# Patient Record
Sex: MALE | Race: WHITE | URBAN - METROPOLITAN AREA
[De-identification: names, ages, dates, MRNs, and addresses within clinical notes are randomized per-mention and may not be internally consistent; named-entity substitution may affect disease eponyms.]

---

## 2018-06-27 ENCOUNTER — OFFICE (OUTPATIENT)
Dept: URBAN - METROPOLITAN AREA CLINIC 101 | Facility: CLINIC | Age: 73
End: 2018-06-27
Payer: MEDICARE

## 2018-06-27 VITALS
HEART RATE: 81 BPM | HEIGHT: 68 IN | DIASTOLIC BLOOD PRESSURE: 70 MMHG | WEIGHT: 167 LBS | TEMPERATURE: 97.9 F | SYSTOLIC BLOOD PRESSURE: 135 MMHG

## 2018-06-27 DIAGNOSIS — R49.0 DYSPHONIA: ICD-10-CM

## 2018-06-27 DIAGNOSIS — K21.9 GASTRO-ESOPHAGEAL REFLUX DISEASE WITHOUT ESOPHAGITIS: ICD-10-CM

## 2018-06-27 DIAGNOSIS — Z79.01 LONG TERM (CURRENT) USE OF ANTICOAGULANTS: ICD-10-CM

## 2018-06-27 DIAGNOSIS — R05 COUGH: ICD-10-CM

## 2018-06-27 PROCEDURE — 99214 OFFICE O/P EST MOD 30 MIN: CPT

## 2018-06-27 NOTE — SERVICEHPINOTES
MAVIS GALEANA   is a   72   male here to discuss GERD. For the past two years, he notes frequent throat clearing, hoarse voice, and chronic cough. He saw Dr. Batista (ENT) who tested him for allergies. He has tried nasal sprays, antihistamines, etc which haven't helped. He underwent a barium swallow which showed GERD. He denies heartburn or GERD. He also denies abdominal pain, anorexia, nausea, vomiting, unexplained weight loss. He has never tried a PPI before. He doesn't smoke or drink ETOH. He had a CVA in 2007 and has been on Plavix for years. He also takes a baby ASA but no additional NSAID use. No known heart issues. No other GI complaints and he is up to date on his colonoscopy.  BR

## 2019-04-17 ENCOUNTER — ON CAMPUS - OUTPATIENT (OUTPATIENT)
Dept: URBAN - METROPOLITAN AREA HOSPITAL 35 | Facility: HOSPITAL | Age: 74
End: 2019-04-17
Payer: OTHER GOVERNMENT

## 2019-04-17 DIAGNOSIS — K20.8 OTHER ESOPHAGITIS: ICD-10-CM

## 2019-04-17 DIAGNOSIS — R05 COUGH: ICD-10-CM

## 2019-04-17 DIAGNOSIS — K29.60 OTHER GASTRITIS WITHOUT BLEEDING: ICD-10-CM

## 2019-04-17 DIAGNOSIS — K21.9 GASTRO-ESOPHAGEAL REFLUX DISEASE WITHOUT ESOPHAGITIS: ICD-10-CM

## 2019-04-17 PROCEDURE — 43239 EGD BIOPSY SINGLE/MULTIPLE: CPT

## 2019-05-07 ENCOUNTER — OFFICE (OUTPATIENT)
Dept: URBAN - METROPOLITAN AREA CLINIC 101 | Facility: CLINIC | Age: 74
End: 2019-05-07
Payer: OTHER GOVERNMENT

## 2019-05-07 VITALS
SYSTOLIC BLOOD PRESSURE: 131 MMHG | DIASTOLIC BLOOD PRESSURE: 70 MMHG | WEIGHT: 179 LBS | TEMPERATURE: 98.4 F | HEART RATE: 79 BPM | HEIGHT: 68 IN

## 2019-05-07 DIAGNOSIS — R05 COUGH: ICD-10-CM

## 2019-05-07 DIAGNOSIS — K21.9 GASTRO-ESOPHAGEAL REFLUX DISEASE WITHOUT ESOPHAGITIS: ICD-10-CM

## 2019-05-07 DIAGNOSIS — B96.81 HELICOBACTER PYLORI [H. PYLORI] AS THE CAUSE OF DISEASES CLA: ICD-10-CM

## 2019-05-07 PROCEDURE — 99213 OFFICE O/P EST LOW 20 MIN: CPT

## 2019-05-07 NOTE — SERVICEHPINOTES
Mr. Mazariegos is here for f/u to recent EGD for chronic cough, GERD, and a hoarse voice. He was initially evaluated for this last year. He ended up undergoing a cardiac ablation so the EGD got postponed until recently. The EGD showed   esophagitis, gastritis, and h pylori. He was treated with a prevpac and is now here for f/u. He has been on a PPI x 1 year. Now, his hoarse voice has cleared up and no longer having to clear his throat.  He is asymptomatic on the PPI with no GI complaints today.

## 2019-07-02 ENCOUNTER — OFFICE (OUTPATIENT)
Dept: URBAN - METROPOLITAN AREA CLINIC 101 | Facility: CLINIC | Age: 74
End: 2019-07-02
Payer: OTHER GOVERNMENT

## 2019-07-02 VITALS
HEART RATE: 75 BPM | TEMPERATURE: 98.4 F | DIASTOLIC BLOOD PRESSURE: 66 MMHG | SYSTOLIC BLOOD PRESSURE: 111 MMHG | WEIGHT: 170 LBS | HEIGHT: 68 IN

## 2019-07-02 DIAGNOSIS — R05 COUGH: ICD-10-CM

## 2019-07-02 DIAGNOSIS — K21.9 GASTRO-ESOPHAGEAL REFLUX DISEASE WITHOUT ESOPHAGITIS: ICD-10-CM

## 2019-07-02 DIAGNOSIS — B96.81 HELICOBACTER PYLORI [H. PYLORI] AS THE CAUSE OF DISEASES CLA: ICD-10-CM

## 2019-07-02 PROCEDURE — 99213 OFFICE O/P EST LOW 20 MIN: CPT

## 2019-07-02 NOTE — SERVICEHPINOTES
Mr. Mazariegos is here for f/u to recent EGD for chronic cough, GERD, and a hoarse voice. He was initially evaluated for this last year. He ended up undergoing a cardiac ablation so the EGD got postponed until recently. The EGD showed esophagitis, gastritis, and h pylori. He was treated with a prevpac and is now here for f/u. He has been on a PPI x 1 year. Now, his hoarse voice has cleared up and no longer having to clear his throat. Unfortunately, he tested still + for h pylori upon breath test. He just finished taking Pylera on 6/23/19. He had diarrhea while on the Pylera but he is now feeling better with normal bowel movements. Currently, not experiencing a cough, hoarse voice, and he isn't feeling any heartburn or acid reflux.

## 2019-08-13 ENCOUNTER — OFFICE (OUTPATIENT)
Dept: URBAN - METROPOLITAN AREA CLINIC 101 | Facility: CLINIC | Age: 74
End: 2019-08-13
Payer: OTHER GOVERNMENT

## 2019-08-13 VITALS
DIASTOLIC BLOOD PRESSURE: 76 MMHG | HEART RATE: 69 BPM | WEIGHT: 172 LBS | SYSTOLIC BLOOD PRESSURE: 112 MMHG | TEMPERATURE: 97.9 F | HEIGHT: 68 IN

## 2019-08-13 DIAGNOSIS — R49.0 DYSPHONIA: ICD-10-CM

## 2019-08-13 DIAGNOSIS — K21.9 GASTRO-ESOPHAGEAL REFLUX DISEASE WITHOUT ESOPHAGITIS: ICD-10-CM

## 2019-08-13 DIAGNOSIS — R05 COUGH: ICD-10-CM

## 2019-08-13 DIAGNOSIS — B96.81 HELICOBACTER PYLORI [H. PYLORI] AS THE CAUSE OF DISEASES CLA: ICD-10-CM

## 2019-08-13 PROCEDURE — 99213 OFFICE O/P EST LOW 20 MIN: CPT

## 2019-08-13 NOTE — SERVICEHPINOTES
Mr. Mazariegos is here for f/u. PMHx as follows:     He was initially evaluated for a chronic cough, hoarse voice which began last year. He ended up undergoing a cardiac ablation so the EGD got postponed until recently. The EGD showed esophagitis, gastritis, and h pylori. He was treated with a prevpac. He has been on a PPI x 1 year. Now, his hoarse voice has cleared up and no longer having to clear his throat. Unfortunately, he tested still + for h pylori upon breath test. He just finished taking Pylera on 6/23/19. He had diarrhea while on the Pylera but he is now feeling better with normal bowel movements. Repeat breath test in July confirmed eradication of h pylori. No heartburn, hoarse voice, cough. He notes occasional reflux about once a week.

## 2025-04-22 ENCOUNTER — ON CAMPUS - OUTPATIENT (OUTPATIENT)
Dept: URBAN - METROPOLITAN AREA HOSPITAL 16 | Facility: HOSPITAL | Age: 80
End: 2025-04-22
Payer: COMMERCIAL

## 2025-04-22 DIAGNOSIS — K63.5 POLYP OF COLON: ICD-10-CM

## 2025-04-22 DIAGNOSIS — R19.4 CHANGE IN BOWEL HABIT: ICD-10-CM

## 2025-04-22 DIAGNOSIS — D12.3 BENIGN NEOPLASM OF TRANSVERSE COLON: ICD-10-CM

## 2025-04-22 DIAGNOSIS — K29.50 UNSPECIFIED CHRONIC GASTRITIS WITHOUT BLEEDING: ICD-10-CM

## 2025-04-22 DIAGNOSIS — K64.9 UNSPECIFIED HEMORRHOIDS: ICD-10-CM

## 2025-04-22 DIAGNOSIS — R10.816 EPIGASTRIC ABDOMINAL TENDERNESS: ICD-10-CM

## 2025-04-22 DIAGNOSIS — K22.89 OTHER SPECIFIED DISEASE OF ESOPHAGUS: ICD-10-CM

## 2025-04-22 PROCEDURE — 45380 COLONOSCOPY AND BIOPSY: CPT | Performed by: INTERNAL MEDICINE

## 2025-04-22 PROCEDURE — 43239 EGD BIOPSY SINGLE/MULTIPLE: CPT | Performed by: INTERNAL MEDICINE
